# Patient Record
Sex: MALE | Race: WHITE | URBAN - METROPOLITAN AREA
[De-identification: names, ages, dates, MRNs, and addresses within clinical notes are randomized per-mention and may not be internally consistent; named-entity substitution may affect disease eponyms.]

---

## 2017-11-27 ENCOUNTER — TELEPHONE (OUTPATIENT)
Dept: FAMILY MEDICINE | Facility: CLINIC | Age: 47
End: 2017-11-27

## 2017-11-27 NOTE — TELEPHONE ENCOUNTER
Spoke with patient:    Is currently in [place] and won't be back until after 4 or 5pm tonight   For past 3-4 days having irregular heart beat - beats normally 3-5 times, then pauses, then resumes regular rhythm - occurred suddenly on Friday at 1AM and woke pt up  Having some fatigue currently   Currently NO pain or pressure in chest or feeling winded with talking- did have this right away but subsided since and did not return   Denies: nausea/vomiting, lightheadedness, edema, history of heart problems or irregular EKG  Has not checked BP recently. Pulse about 60 bpm    Normal amount of caffeine 2-3 cups coffee - seemed to worsen symptoms.   Avoiding alcohol and caffeine currently   More sedentary lifestyle recently     Arrhythmia has been persistent. Advised pt be seen soon today but ED if any return of chest pain, SOB, chest pressure, or new symptoms. Pt states he cannot be seen while in [place] but will plan to go to urgent care once back in [facility]. Agreed to go to ED if any new/worsening symptoms     Gilda CONNELL RN

## 2017-11-28 ENCOUNTER — TELEPHONE (OUTPATIENT)
Dept: FAMILY MEDICINE | Facility: CLINIC | Age: 47
End: 2017-11-28

## 2017-11-28 ENCOUNTER — OFFICE VISIT (OUTPATIENT)
Dept: FAMILY MEDICINE | Facility: CLINIC | Age: 47
End: 2017-11-28
Payer: COMMERCIAL

## 2017-11-28 VITALS
TEMPERATURE: 96.8 F | HEIGHT: 72 IN | HEART RATE: 71 BPM | DIASTOLIC BLOOD PRESSURE: 85 MMHG | SYSTOLIC BLOOD PRESSURE: 125 MMHG | BODY MASS INDEX: 22.75 KG/M2 | WEIGHT: 168 LBS | OXYGEN SATURATION: 99 %

## 2017-11-28 DIAGNOSIS — R00.2 PALPITATIONS: Primary | ICD-10-CM

## 2017-11-28 DIAGNOSIS — I49.3 PVC'S (PREMATURE VENTRICULAR CONTRACTIONS): ICD-10-CM

## 2017-11-28 DIAGNOSIS — I49.3 PVC (PREMATURE VENTRICULAR CONTRACTION): Primary | ICD-10-CM

## 2017-11-28 LAB
ANION GAP SERPL CALCULATED.3IONS-SCNC: 8 MMOL/L (ref 3–14)
BUN SERPL-MCNC: 15 MG/DL (ref 7–30)
CALCIUM SERPL-MCNC: 9.5 MG/DL (ref 8.5–10.1)
CHLORIDE SERPL-SCNC: 105 MMOL/L (ref 94–109)
CO2 SERPL-SCNC: 28 MMOL/L (ref 20–32)
CREAT SERPL-MCNC: 1.12 MG/DL (ref 0.66–1.25)
ERYTHROCYTE [DISTWIDTH] IN BLOOD BY AUTOMATED COUNT: 13.4 % (ref 10–15)
GFR SERPL CREATININE-BSD FRML MDRD: 70 ML/MIN/1.7M2
GLUCOSE SERPL-MCNC: 94 MG/DL (ref 70–99)
HCT VFR BLD AUTO: 45.5 % (ref 40–53)
HGB BLD-MCNC: 15.6 G/DL (ref 13.3–17.7)
MCH RBC QN AUTO: 30.4 PG (ref 26.5–33)
MCHC RBC AUTO-ENTMCNC: 34.3 G/DL (ref 31.5–36.5)
MCV RBC AUTO: 89 FL (ref 78–100)
PLATELET # BLD AUTO: 169 10E9/L (ref 150–450)
POTASSIUM SERPL-SCNC: 5 MMOL/L (ref 3.4–5.3)
RBC # BLD AUTO: 5.13 10E12/L (ref 4.4–5.9)
SODIUM SERPL-SCNC: 141 MMOL/L (ref 133–144)
TROPONIN I SERPL-MCNC: <0.015 UG/L (ref 0–0.04)
TSH SERPL DL<=0.005 MIU/L-ACNC: 1.72 MU/L (ref 0.4–4)
WBC # BLD AUTO: 3.9 10E9/L (ref 4–11)

## 2017-11-28 PROCEDURE — 85027 COMPLETE CBC AUTOMATED: CPT | Performed by: NURSE PRACTITIONER

## 2017-11-28 PROCEDURE — 36415 COLL VENOUS BLD VENIPUNCTURE: CPT | Performed by: NURSE PRACTITIONER

## 2017-11-28 PROCEDURE — 84484 ASSAY OF TROPONIN QUANT: CPT | Performed by: NURSE PRACTITIONER

## 2017-11-28 PROCEDURE — 93000 ELECTROCARDIOGRAM COMPLETE: CPT | Performed by: NURSE PRACTITIONER

## 2017-11-28 PROCEDURE — 80048 BASIC METABOLIC PNL TOTAL CA: CPT | Performed by: NURSE PRACTITIONER

## 2017-11-28 PROCEDURE — 99215 OFFICE O/P EST HI 40 MIN: CPT | Performed by: NURSE PRACTITIONER

## 2017-11-28 PROCEDURE — 84443 ASSAY THYROID STIM HORMONE: CPT | Performed by: NURSE PRACTITIONER

## 2017-11-28 NOTE — MR AVS SNAPSHOT
After Visit Summary   11/28/2017    Wilfrid Hernandez    MRN: [id number]           Patient Information     Date Of Birth          1970        Visit Information        Provider Department      11/28/2017 11:30 AM Shana Cobb APRN [facility]        Today's Diagnoses     Palpitations    -  1    PVC's (premature ventricular contractions)          Care Instructions      Understanding Premature Ventricular Contractions (PVCs)  Premature ventricular contractions (PVCs) are a type of abnormal heartbeat (arrhythmia). They are common and can occur in people of all ages from time to time. PVCs are almost never dangerous, but if you have other heart problems, PVCs can cause serious health issues.   How PVCs happen    Your heart has 4 chambers: 2 upper atria and 2 lower ventricles. Normally, a special group of cells begins the signal to start your heartbeat. These cells are in the sinoatrial (SA) node in the right atrium. The signal quickly travels down your heart s conducting system. It travels to the left and right ventricle. As it travels, the signal triggers nearby parts of your heart to contract. This allows your heart to squeeze in a coordinated way.  During a premature ventricular contraction, the signal to start your heartbeat instead comes from one of the ventricles. This signal is premature, meaning it happens before the SA node has had a chance to fire. The signal spreads through the rest of your heart, causing a heartbeat. If this happens very soon after the previous heartbeat, your heart will push out very little blood. This causes a feeling of a pause between beats. If it happens a little later, your heart pushes out an almost normal amount of blood. This leads to a feeling of an extra heartbeat. So, the heart has a  premature  heartbeat in between normal heartbeats.  What causes PVCs?  PVCs can be considered a normal phenomenon, especially if they happen rarely, don't cause  serious symptoms, and are not associated with other serious medical conditions. PVCs can be seen more often if you have certain conditions such as:     Advancing age    Reduced blood flow to your heart such as in coronary artery disease    Scarring after a heart attack (myocardial infarction)    Cardiomyopathy (heart muscle disease) of any cause    Heart failure    Heart valve disease    High blood pressure    Congenital heart disease    Electrolyte problems, such as low potassium levels    Increased adrenaline, such as with anxiety    Certain medicines, such as in digoxin toxicity  Rarely, frequent PVCs may be seen when there are no risk factors or other medical conditions. This is called idiopathic PVC.   What are the symptoms of PVCs?  Most people with infrequent PVCs don t have symptoms. However, the more PVCs you have, the more likely you are to feel them. When symptoms do happen, they are usually minor. Symptoms may include:    An awareness of the heart beating    A fluttering or flip-flop feeling in your chest    Feeling of a  skipped  or  extra  heartbeat    Dizziness and near-fainting    A pulsing sensation in the neck  PVCs may cause more severe symptoms if you have another heart problem, such as heart failure.  How are PVCs diagnosed?  Your healthcare provider will ask about your health history and give you a physical exam. Recording the PVCs and their frequency on a heart rhythm monitor is the best way to diagnose PVCs. This can best be done with:    Electrocardiogram (ECG). This test records the electrical activity of your heart. During an ECG, small pads (electrodes) are placed on your chest, arms, and legs. Wires connect the pads to a machine, which records your heart's electrical signals.    Heart monitoring. There are 2 types of external heart monitors:    Holter monitor. This monitor can be worn for 1 to 7 days. It provides a constant recording of heart activity. After the test is done, your  healthcare provider analyzes the recording.    Event monitor. These monitors can be used for 3 to 4 weeks. One kind is a memory loop recorder. This monitor records constantly, but stores the recording only when you press a button. The other kind is a credit card-sized recorder. This monitor is turned on only during an episode. With both types, you send the recordings of symptoms to your healthcare provider over the phone.  Other tests, which may be done to better evaluate your heart include:    Echocardiography. This test uses ultrasound to look at your heart s structure and function.    Cardiac stress testing. This test uses electrocardiogram during exercise to see how your heart responds and to evaluate heart artery blood flow.    Cardiac CT or MRI. These are imaging tests that shows details of the heart and blood vessels.    Blood tests. Blood tests may be done to check potassium and thyroid levels.  How are PVCs treated?  Treatment depends on the cause and if you have symptoms. If you have no symptoms and no underlying heart problems, you may need no treatment.   If it is needed, treatment can involve:     Lifestyle changes. Your doctor may suggest you exercise and limit caffeine and alcohol. If you smoke, you'll be advised to quit. Your doctor can help you find medicines or nicotine replacement products to help. A support group for those quitting smoking is also helpful.    Radiofrequency catheter ablation. This treatment uses radiofrequency energy to destroy the area of heart tissue that is causing the PVC.     Medicines. Two types of medicine can help with PVCs. Beta blockers and calcium channel blockers both can lower the heart rate and reduce blood pressure.   If you have structural heart problems, you may be referred to a doctor who specializes in the electrical system of the heart. You may need treatment for an underlying heart problem. In severe cases, an ICD (implantable cardioverter defibrillator) may  "be implanted. This device can help normalize the heart rhythm.  Date Last Reviewed: 2017-2017 [employer]. [address] [place] [address], [place] [address]. All rights reserved. This information is not intended as a substitute for professional medical care. Always follow your healthcare professional's instructions.                Follow-ups after your visit        Who to contact     If you have questions or need follow up information about today's clinic visit or your schedule please contact [facility] directly at [phone].  Normal or non-critical lab and imaging results will be communicated to you by cinvolvehart, letter or phone within 4 business days after the clinic has received the results. If you do not hear from us within 7 days, please contact the clinic through Netact or phone. If you have a critical or abnormal lab result, we will notify you by phone as soon as possible.  Submit refill requests through [employer]ify or call your pharmacy and they will forward the refill request to us. Please allow 3 business days for your refill to be completed.          Additional Information About Your Visit        Storify Information     [employer] lets you send messages to your doctor, view your test results, renew your prescriptions, schedule appointments and more. To sign up, go to [email or web address] . Click on \"Log in\" on the left side of the screen, which will take you to the Welcome page. Then click on \"Sign up Now\" on the right side of the page.     You will be asked to enter the access code listed below, as well as some personal information. Please follow the directions to create your username and password.     Your access code is: [email or web address]  Expires: 2018 12:19 PM     Your access code will  in 90 days. If you need help or a new code, please call your [facility] or [phone].        Care EveryWhere ID     This is your Care EveryWhere ID. This could be used by " other organizations to access your [place] medical records  [id number]        Your Vitals Were     Pulse Temperature Height Pulse Oximetry BMI (Body Mass Index)       71 96.8  F (36  C) (Oral) 6' (1.829 m) 99% 22.78 kg/m2        Blood Pressure from Last 3 Encounters:   11/28/17 125/85   11/09/16 131/83   10/13/16 124/81    Weight from Last 3 Encounters:   11/28/17 168 lb (76.2 kg)   11/09/16 170 lb 12.8 oz (77.5 kg)   10/13/16 173 lb (78.5 kg)              We Performed the Following     Basic metabolic panel  (Ca, Cl, CO2, Creat, Gluc, K, Na, BUN)     CBC with platelets     EKG 12-lead complete w/read - Clinics     Troponin I     TSH with free T4 reflex        Primary Care Provider Office Phone # Fax #    Maverick Penn [phone] [address] [facility] [address]  [facility] [address]        Equal Access to Services     DMITRI MATA : Hadii aad ku hadasho Soomaali, waaxda luqadaha, qaybta kaalmada adeegyakee, bird muse . So [facility] [place] [phone].    ATENCIÓN: Si habla español, tiene a damon disposición servicios gratuitos de asistencia lingüística. [facility] [phone].    We comply with applicable federal civil rights laws and [place] laws. We do not discriminate on the basis of race, color, national origin, age, disability, sex, sexual orientation, or gender identity.            Thank you!     Thank you for choosing [facility]  for your care. Our goal is always to provide you with excellent care. Hearing back from our patients is one way we can continue to improve our services. Please take a few minutes to complete the written survey that you may receive in the mail after your visit with us. Thank you!             Your Updated Medication List - Protect others around you: Learn how to safely use, store and throw away your medicines at www.disposemymeds.org.      Notice  As of 11/28/2017 12:19 PM    You have not been prescribed any medications.

## 2017-11-28 NOTE — TELEPHONE ENCOUNTER
Called pt back. Timing is ok. He can also f/u with cardiology at least 1 week after eval is complete

## 2017-11-28 NOTE — PATIENT INSTRUCTIONS
Understanding Premature Ventricular Contractions (PVCs)  Premature ventricular contractions (PVCs) are a type of abnormal heartbeat (arrhythmia). They are common and can occur in people of all ages from time to time. PVCs are almost never dangerous, but if you have other heart problems, PVCs can cause serious health issues.   How PVCs happen    Your heart has 4 chambers: 2 upper atria and 2 lower ventricles. Normally, a special group of cells begins the signal to start your heartbeat. These cells are in the sinoatrial (SA) node in the right atrium. The signal quickly travels down your heart s conducting system. It travels to the left and right ventricle. As it travels, the signal triggers nearby parts of your heart to contract. This allows your heart to squeeze in a coordinated way.  During a premature ventricular contraction, the signal to start your heartbeat instead comes from one of the ventricles. This signal is premature, meaning it happens before the SA node has had a chance to fire. The signal spreads through the rest of your heart, causing a heartbeat. If this happens very soon after the previous heartbeat, your heart will push out very little blood. This causes a feeling of a pause between beats. If it happens a little later, your heart pushes out an almost normal amount of blood. This leads to a feeling of an extra heartbeat. So, the heart has a  premature  heartbeat in between normal heartbeats.  What causes PVCs?  PVCs can be considered a normal phenomenon, especially if they happen rarely, don't cause serious symptoms, and are not associated with other serious medical conditions. PVCs can be seen more often if you have certain conditions such as:     Advancing age    Reduced blood flow to your heart such as in coronary artery disease    Scarring after a heart attack (myocardial infarction)    Cardiomyopathy (heart muscle disease) of any cause    Heart failure    Heart valve disease    High blood  pressure    Congenital heart disease    Electrolyte problems, such as low potassium levels    Increased adrenaline, such as with anxiety    Certain medicines, such as in digoxin toxicity  Rarely, frequent PVCs may be seen when there are no risk factors or other medical conditions. This is called idiopathic PVC.   What are the symptoms of PVCs?  Most people with infrequent PVCs don t have symptoms. However, the more PVCs you have, the more likely you are to feel them. When symptoms do happen, they are usually minor. Symptoms may include:    An awareness of the heart beating    A fluttering or flip-flop feeling in your chest    Feeling of a  skipped  or  extra  heartbeat    Dizziness and near-fainting    A pulsing sensation in the neck  PVCs may cause more severe symptoms if you have another heart problem, such as heart failure.  How are PVCs diagnosed?  Your healthcare provider will ask about your health history and give you a physical exam. Recording the PVCs and their frequency on a heart rhythm monitor is the best way to diagnose PVCs. This can best be done with:    Electrocardiogram (ECG). This test records the electrical activity of your heart. During an ECG, small pads (electrodes) are placed on your chest, arms, and legs. Wires connect the pads to a machine, which records your heart's electrical signals.    Heart monitoring. There are 2 types of external heart monitors:    Holter monitor. This monitor can be worn for 1 to 7 days. It provides a constant recording of heart activity. After the test is done, your healthcare provider analyzes the recording.    Event monitor. These monitors can be used for 3 to 4 weeks. One kind is a memory loop recorder. This monitor records constantly, but stores the recording only when you press a button. The other kind is a credit card-sized recorder. This monitor is turned on only during an episode. With both types, you send the recordings of symptoms to your healthcare  provider over the phone.  Other tests, which may be done to better evaluate your heart include:    Echocardiography. This test uses ultrasound to look at your heart s structure and function.    Cardiac stress testing. This test uses electrocardiogram during exercise to see how your heart responds and to evaluate heart artery blood flow.    Cardiac CT or MRI. These are imaging tests that shows details of the heart and blood vessels.    Blood tests. Blood tests may be done to check potassium and thyroid levels.  How are PVCs treated?  Treatment depends on the cause and if you have symptoms. If you have no symptoms and no underlying heart problems, you may need no treatment.   If it is needed, treatment can involve:     Lifestyle changes. Your doctor may suggest you exercise and limit caffeine and alcohol. If you smoke, you'll be advised to quit. Your doctor can help you find medicines or nicotine replacement products to help. A support group for those quitting smoking is also helpful.    Radiofrequency catheter ablation. This treatment uses radiofrequency energy to destroy the area of heart tissue that is causing the PVC.     Medicines. Two types of medicine can help with PVCs. Beta blockers and calcium channel blockers both can lower the heart rate and reduce blood pressure.   If you have structural heart problems, you may be referred to a doctor who specializes in the electrical system of the heart. You may need treatment for an underlying heart problem. In severe cases, an ICD (implantable cardioverter defibrillator) may be implanted. This device can help normalize the heart rhythm.  Date Last Reviewed: 8/1/2017 2000-2017 [employer]. [address] [place] [address], [place] [address]. All rights reserved. This information is not intended as a substitute for professional medical care. Always follow your healthcare professional's instructions.

## 2017-11-28 NOTE — PROGRESS NOTES
HPI      SUBJECTIVE:   Wilfrid Hernandez is a 47 year old male who presents to clinic today for the following health issues:    Chief Complaint   Patient presents with     Heart Problem     irregular heart beats --woke pt up Friday 1 AM per Tel Enc        Thanksgiving night started with irregular heart beat, felt like it was missing. Did have alcohol and more food than normal   The next morning had a very mild chest pain, fatigue and lightheadedness   The CP was on and off for the first 2 days and often just a pressure   No N/V   Was then a couple days after at a higher elevation at 6,000 feet and was slightly lightheaded and SOB   SOB is improved but continues just slightly   Works as a  so was concerned about this new heart symptom       No past medical history on file.  No past surgical history on file.  Social History   Substance Use Topics     Smoking status: Former Smoker     Packs/day: 0.50     Years: 5.00     Types: Cigarettes     Quit date: 1/1/1997     Smokeless tobacco: Never Used     Alcohol use 0.0 oz/week     0 Standard drinks or equivalent per week      Comment: 3 glasses of wine per week     No current outpatient prescriptions on file.     Allergies   Allergen Reactions     No Known Allergies        Reviewed and updated as needed this visit by clinical staff and provider      ROS  Detailed as above       /85 (Cuff Size: Adult Regular)  Pulse 71  Temp 96.8  F (36  C) (Oral)  Ht 6' (1.829 m)  Wt 168 lb (76.2 kg)  SpO2 99%  BMI 22.78 kg/m2      Physical Exam   Constitutional: He is well-developed, well-nourished, and in no distress.   HENT:   Head: Normocephalic.   Eyes: Conjunctivae are normal.   Neck: Normal range of motion.   Cardiovascular: Normal rate, regular rhythm and normal heart sounds.    No murmur heard.  Frequent ectopic beats    Pulmonary/Chest: Effort normal and breath sounds normal. No respiratory distress.   Musculoskeletal: Normal range of motion.   Neurological: He is  alert.   Skin: Skin is warm and dry.   Psychiatric: Mood and affect normal.   Vitals reviewed.      Assessment and Plan:       ICD-10-CM    1. Palpitations R00.2 EKG 12-lead complete w/read - Clinics     CBC with platelets     Basic metabolic panel  (Ca, Cl, CO2, Creat, Gluc, K, Na, BUN)     TSH with free T4 reflex     Troponin I     Echocardiogram Complete   2. PVC's (premature ventricular contractions) I49.3 Echocardiogram Complete     Holter Monitor 24 hour - Adult       New frequent PVCs that started at thanksgiving with some alcohol and more food than normal. No other acute findings on ekg today  Completed basic labs per above and they are normal   Considering this acute rather significant onset, will complete further workup of echo and holter.   After these results then we will refer to cardiology for further treatment discussion     I called pt with all results and gave info for echo and holter.       Shana Cobb, APRN, CNP  [facility]

## 2017-11-28 NOTE — TELEPHONE ENCOUNTER
Reason for Call:  Questions regarding upcoming appointment     Detailed comments: Pt has questions regarding a cardiology appointment he has   Set up. He states the appointment is in a week and he wants to make sure that will be okay.   **PT requesting to speak with BRANDAN Cobb**   Phone Number Patient can be reached at: Cell number on file:    Telephone Information:   Mobile [phone]       Best Time: anytime    Can we leave a detailed message on this number? YES    Call taken on 11/28/2017 at 4:36 PM by Mitra Espinal

## 2017-11-28 NOTE — NURSING NOTE
Chief Complaint   Patient presents with     Heart Problem     irregular heart beats --woke pt up Friday 1 AM per Tel Enc        Initial /85 (Cuff Size: Adult Regular)  Pulse 71  Temp 96.8  F (36  C) (Oral)  Ht 6' (1.829 m)  Wt 168 lb (76.2 kg)  SpO2 99%  BMI 22.78 kg/m2 Estimated body mass index is 22.78 kg/(m^2) as calculated from the following:    Height as of this encounter: 6' (1.829 m).    Weight as of this encounter: 168 lb (76.2 kg).  Medication Reconciliation: complete

## 2017-12-05 ENCOUNTER — HOSPITAL ENCOUNTER (OUTPATIENT)
Dept: CARDIOLOGY | Facility: CLINIC | Age: 47
End: 2017-12-05
Attending: NURSE PRACTITIONER
Payer: COMMERCIAL

## 2017-12-05 ENCOUNTER — HOSPITAL ENCOUNTER (OUTPATIENT)
Dept: CARDIOLOGY | Facility: CLINIC | Age: 47
Discharge: HOME | End: 2017-12-05
Attending: NURSE PRACTITIONER | Admitting: NURSE PRACTITIONER
Payer: COMMERCIAL

## 2017-12-05 DIAGNOSIS — I49.3 PVC'S (PREMATURE VENTRICULAR CONTRACTIONS): ICD-10-CM

## 2017-12-05 DIAGNOSIS — R00.2 PALPITATIONS: ICD-10-CM

## 2017-12-05 PROCEDURE — 93227 XTRNL ECG REC<48 HR R&I: CPT | Performed by: INTERNAL MEDICINE

## 2017-12-05 PROCEDURE — 93306 TTE W/DOPPLER COMPLETE: CPT

## 2017-12-05 PROCEDURE — 93306 TTE W/DOPPLER COMPLETE: CPT | Mod: 26 | Performed by: INTERNAL MEDICINE

## 2017-12-05 PROCEDURE — 93225 XTRNL ECG REC<48 HRS REC: CPT

## 2017-12-05 NOTE — LETTER
[address] [facility]  [address]  [place]  [address]  Tel: [phone]    December 12, 2017    Wilfrid Hernandez  [address] [place] [address]        Dear John David,    Here is the holter monitor report as well as the echocardiogram report for your records as we discussed. Let us know if you have any questions.        Sincerely,    Shana Cobb NP/SML      Enclosure: reports

## 2017-12-11 ENCOUNTER — TELEPHONE (OUTPATIENT)
Dept: FAMILY MEDICINE | Facility: CLINIC | Age: 47
End: 2017-12-11

## 2017-12-11 NOTE — TELEPHONE ENCOUNTER
Zuhair Saldana,    The patient was calling back from the voice message you left him on Friday on his Holter and Echo. I did not see any detailed message that I could give him but he was ok with that, he said he would rather talk to you directly. He knows you wont be back in the office until tomorrow and will expect to talk to you sometime tomorrow regarding these results.    Jeyson Liriano, CMA

## 2017-12-12 NOTE — PROGRESS NOTES
Will you please mail page 1 of the scanned report to the pt. There is also an echo to include in the same envelope.     Wilfrid  Here is the holter monitor report as well as the echocardiogram report for your records as we discussed. Let us know if you have any questions  RUT Rosas CNP

## 2017-12-12 NOTE — PROGRESS NOTES
Will you please mail to the pt. There is also another report (holter) to include in the same envelope.     Wilfrid  Here is the holter monitor report as well as the echocardiogram report for your records as we discussed. Let us know if you have any questions  RUT Rosas CNP

## 2017-12-19 ENCOUNTER — OFFICE VISIT (OUTPATIENT)
Dept: CARDIOLOGY | Facility: CLINIC | Age: 47
End: 2017-12-19
Attending: NURSE PRACTITIONER
Payer: COMMERCIAL

## 2017-12-19 VITALS
OXYGEN SATURATION: 99 % | BODY MASS INDEX: 22.75 KG/M2 | WEIGHT: 168 LBS | HEART RATE: 97 BPM | DIASTOLIC BLOOD PRESSURE: 56 MMHG | HEIGHT: 72 IN | SYSTOLIC BLOOD PRESSURE: 148 MMHG

## 2017-12-19 DIAGNOSIS — I49.9 IRREGULAR HEART BEAT: Primary | ICD-10-CM

## 2017-12-19 DIAGNOSIS — I49.3 PVC'S (PREMATURE VENTRICULAR CONTRACTIONS): ICD-10-CM

## 2017-12-19 PROCEDURE — 99213 OFFICE O/P EST LOW 20 MIN: CPT | Performed by: INTERNAL MEDICINE

## 2017-12-19 PROCEDURE — 93000 ELECTROCARDIOGRAM COMPLETE: CPT | Performed by: INTERNAL MEDICINE

## 2017-12-19 NOTE — LETTER
12/19/2017      Maverick Penn MD  [address] [facility] [address]  [facility] [address]      RE: Wilfrid Hernandez       Dear Colleague,    I had the pleasure of seeing Wilfrid Hernandez in the [facility].    HISTORY OF PRESENT ILLNESS:    Thank you for referring Mr. Wilfrid Hernandez for evaluation of symptomatic premature ventricular beats.  He is a delightful 47-year-old gentleman who is a .  He has no history of cardiac disease and takes no medications routinely.      He tells me that the night after Thanksgiving, he experienced palpitation.  This was pronounced.  He initially experienced a vague chest discomfort which later subsided, though the sensation of palpitation remained.      On 11/28/2017, he was seen in Dr. Penn's office and a 12-lead ECG showed sinus rhythm with 2 PVCs.  Subsequently, a 24-hour Holter monitor showed a PVC burden of 7%.  The patient had mostly single PVCs; there were some couplets but no runs of ventricular tachycardia.      Mr. Hernandez at that point stopped consuming caffeine and started exercising regularly.  Over the past week or so, symptoms have completely subsided.  He feels very well.      He does not have chest discomfort with exertion.  He walks fast on his treadmill and a 7.5% incline without symptoms for 30 minutes.  He has no discomfort with that.  He has never had syncope.  There is no family history of premature heart disease or sudden cardiac death.      I do have a fasting lipid panel from last year with LDL of 129 with an HDL of 66.      PHYSICAL EXAMINATION:   VITAL SIGNS:  Blood pressure 140/62, pulse 90 and regular, weight 76 kg, height 182 cm.   GENERAL:  He is a pleasant gentleman who appears healthy.   HEENT:  Normocephalic.  Sclerae anicteric.  Mouth, oropharynx clear.   NECK:  Supple, without carotid bruits.  Normal jugular venous pressure.  No lymphadenopathy.   LUNGS:  Clear bilaterally.   CARDIOVASCULAR:  Normal JVP.   Regular rhythm, without gallop, murmur, or rub.   ABDOMEN:  Soft, nontender.  Negative HJR.   EXTREMITIES:  No edema, 2+ peripheral pulses throughout.   SKIN:  No rashes.   BACK:  No CVA tenderness.   NEUROLOGIC:  Alert and oriented x3.      DIAGNOSTIC STUDIES:    Recent laboratory tests:  Sodium 141, potassium 5.0, creatinine 1.1.  Cholesterol 221, , HDL 66, triglycerides 130.  TSH 1.7.  Troponin less than 0.015.      His 12-lead ECG today was normal.  The previous ECG on 11/28/2017 showed sinus rhythm with 2 PVCs.  The PVCs exhibited superior axis.  They were not seen in the precordial leads and therefore it is not clear whether they arise in the right or left ventricle.      An echocardiogram in early December was a normal study.  There was mild 1+ tricuspid regurgitation.  LV function was normal with EF of 55-60%.      For results of his Holter monitor, see HPI.        IMPRESSION:   1.  Idiopathic premature ventricular beats.  Mr. Hernandez went through a 2-3 week period with symptomatic PVCs.  The etiology is unknown.  The patient has no evidence of structural heart disease by history, physical examination, electrocardiography and echocardiography.  For the sake of completeness, I have requested an exercise stress test on a James protocol.       Provided that the stress test is normal, I would recommend no further cardiac evaluation or treatment, especially considering that his symptoms have resolved.  I did explain that PVCs can sometimes recur.  If they do I encouraged Wilfrid to ignore them as much as possible as they are innocent with no apparent adverse long-term implications.      RECOMMENDATIONS:   A.  Exercise stress test on a James protocol.   B.  If the stress test is normal, there is no need for longterm followup with Cardiology for this innocent issue.      Thank you for the opportunity to be part of his care.        No outpatient encounter prescriptions on file as of 12/19/2017.     No  facility-administered encounter medications on file as of 12/19/2017.        Again, thank you for allowing me to participate in the care of your patient.      Sincerely,    Stone Borrero MD     [facility]    cc:   R[facility]  [address] [facility] [address]  [place] [address]

## 2017-12-19 NOTE — PROGRESS NOTES
HPI and Plan:   See dictation    Orders Placed This Encounter   Procedures     EKG 12-lead complete w/read - Clinics (performed today)     Treadmill Stress test       No orders of the defined types were placed in this encounter.      There are no discontinued medications.      Encounter Diagnoses   Name Primary?     CARDIOVASCULAR SCREENING; LDL GOAL LESS THAN 160 Yes     Irregular heart beat      PVC's (premature ventricular contractions)        CURRENT MEDICATIONS:  No current outpatient prescriptions on file.       ALLERGIES     Allergies   Allergen Reactions     No Known Allergies        PAST MEDICAL HISTORY:  Past Medical History:   Diagnosis Date     Palpitations      PVC (premature ventricular contraction)        PAST SURGICAL HISTORY:  History reviewed. No pertinent surgical history.    FAMILY HISTORY:  Family History   Problem Relation Age of Onset     HEART DISEASE No family hx of      DIABETES No family hx of        SOCIAL HISTORY:  Social History     Social History     Marital status:      Spouse name: Janice     Number of children: 0     Years of education: N/A     Occupational History      [employer]     Social History Main Topics     Smoking status: Former Smoker     Packs/day: 0.50     Years: 5.00     Types: Cigarettes     Quit date: 1/1/1997     Smokeless tobacco: Never Used     Alcohol use 0.0 oz/week     0 Standard drinks or equivalent per week      Comment: 3 glasses of wine per week     Drug use: No     Sexual activity: Yes     Partners: Female     Other Topics Concern     None     Social History Narrative       Review of Systems:  Skin:  Negative       Eyes:  Negative      ENT:  Negative      Respiratory:  Negative       Cardiovascular:  Negative      Gastroenterology: Negative      Genitourinary:  Negative      Musculoskeletal:  Negative      Neurologic:  Negative      Psychiatric:  Negative      Heme/Lymph/Imm:  Negative      Endocrine:  Negative          [id number]

## 2017-12-19 NOTE — PROGRESS NOTES
HISTORY OF PRESENT ILLNESS:    Thank you for referring Mr. Wilfrid Hernandez for evaluation of symptomatic premature ventricular beats.  He is a delightful 47-year-old gentleman who is a .  He has no history of cardiac disease and takes no medications routinely.      He tells me that the night after Thanksgiving, he experienced palpitation.  This was pronounced.  He initially experienced a vague chest discomfort which later subsided, though the sensation of palpitation remained.      On 11/28/2017, he was seen in Dr. Penn's office and a 12-lead ECG showed sinus rhythm with 2 PVCs.  Subsequently, a 24-hour Holter monitor showed a PVC burden of 7%.  The patient had mostly single PVCs; there were some couplets but no runs of ventricular tachycardia.      Mr. Hernandez at that point stopped consuming caffeine and started exercising regularly.  Over the past week or so, symptoms have completely subsided.  He feels very well.      He does not have chest discomfort with exertion.  He walks fast on his treadmill and a 7.5% incline without symptoms for 30 minutes.  He has no discomfort with that.  He has never had syncope.  There is no family history of premature heart disease or sudden cardiac death.      I do have a fasting lipid panel from last year with LDL of 129 with an HDL of 66.      PHYSICAL EXAMINATION:   VITAL SIGNS:  Blood pressure 140/62, pulse 90 and regular, weight 76 kg, height 182 cm.   GENERAL:  He is a pleasant gentleman who appears healthy.   HEENT:  Normocephalic.  Sclerae anicteric.  Mouth, oropharynx clear.   NECK:  Supple, without carotid bruits.  Normal jugular venous pressure.  No lymphadenopathy.   LUNGS:  Clear bilaterally.   CARDIOVASCULAR:  Normal JVP.  Regular rhythm, without gallop, murmur, or rub.   ABDOMEN:  Soft, nontender.  Negative HJR.   EXTREMITIES:  No edema, 2+ peripheral pulses throughout.   SKIN:  No rashes.   BACK:  No CVA tenderness.   NEUROLOGIC:  Alert and oriented  x3.      DIAGNOSTIC STUDIES:    Recent laboratory tests:  Sodium 141, potassium 5.0, creatinine 1.1.  Cholesterol 221, , HDL 66, triglycerides 130.  TSH 1.7.  Troponin less than 0.015.      His 12-lead ECG today was normal.  The previous ECG on 11/28/2017 showed sinus rhythm with 2 PVCs.  The PVCs exhibited superior axis.  They were not seen in the precordial leads and therefore it is not clear whether they arise in the right or left ventricle.      An echocardiogram in early December was a normal study.  There was mild 1+ tricuspid regurgitation.  LV function was normal with EF of 55-60%.      For results of his Holter monitor, see HPI.        IMPRESSION:   1.  Idiopathic premature ventricular beats.  Mr. Hernandez went through a 2-3 week period with symptomatic PVCs.  The etiology is unknown.  The patient has no evidence of structural heart disease by history, physical examination, electrocardiography and echocardiography.  For the sake of completeness, I have requested an exercise stress test on a James protocol.       Provided that the stress test is normal, I would recommend no further cardiac evaluation or treatment, especially considering that his symptoms have resolved.  I did explain that PVCs can sometimes recur.  If they do I encouraged Wilfrid to ignore them as much as possible as they are innocent with no apparent adverse long-term implications.      RECOMMENDATIONS:   A.  Exercise stress test on a James protocol.   B.  If the stress test is normal, there is no need for longterm followup with Cardiology for this innocent issue.      Thank you for the opportunity to be part of his care.         CARLOTTA WATERS MD, FACC         cc:   Shana Cobb NP   [address]  [address]      Maverick Penn MD   [address]  [address]          D: 12/19/2017 13:22   T: 12/19/2017 14:57    MT: RICHARDSON      Name:     TARA HUNTER   MRN:      [id number]        Account:      [id number]   :      1970           Service Date: 2017      Document: [id number]

## 2017-12-19 NOTE — MR AVS SNAPSHOT
After Visit Summary   12/19/2017    Wilfrid Hernandez    MRN: [id number]           Patient Information     Date Of Birth          1970        Visit Information        Provider Department      12/19/2017 12:45 PM Stone Borrero MD [facility]   [place]        Today's Diagnoses     CARDIOVASCULAR SCREENING; LDL GOAL LESS THAN 160    -  1    Irregular heart beat        PVC's (premature ventricular contractions)           Follow-ups after your visit        Your next 10 appointments already scheduled     Dec 20, 2017  2:00 PM CST   Cardiac Stress Test with RH TREADMILL   [facility] ([facility])    [address]  [facility] [address]   [phone]           1. Please bring or wear a comfortable two-piece outfit and walking shoes. 2. Stop eating 3 hours before the test. You may drink water or juice. 3. Stop all caffeine 12 hours before the test. This includes coffee, tea, soda pop, chocolate and certain medicines (such as Anacin and Excederin). Also avoid decaf coffee and tea, as these contain small amounts of caffeine. 4. No alcohol, smoking or use of other tobacco products for 12 hours before the test. 5. Refer to your provider instructions to see if you need to stop any medications (such as beta-blockers or nitrates) for this test. 6. For patients with diabetes: - If you take insulin, call your diabetes care team. Ask if you should take a   dose the morning of your test. - If you take diabetes medicine by mouth, don't take it on the morning of your test. Bring it with you to take after the test. (If you have questions, call your diabetes care team) 7. When you arrive, please tell us if: -You have diabetes. -You have taken Viagra, Cialis or Levitra in the past 48 hours. 8. For any questions that cannot be answered, please contact the ordering physician              Future tests that were ordered for you today     Open  "Future Orders        Priority Expected Expires Ordered    Treadmill Stress test Routine 2017            Who to contact     If you have questions or need follow up information about today's clinic visit or your schedule please contact [facility] directly at [phone].  Normal or non-critical lab and imaging results will be communicated to you by MyChart, letter or phone within 4 business days after the clinic has received the results. If you do not hear from us within 7 days, please contact the clinic through reQallhart or phone. If you have a critical or abnormal lab result, we will notify you by phone as soon as possible.  Submit refill requests through zuuka! or call your pharmacy and they will forward the refill request to us. Please allow 3 business days for your refill to be completed.          Additional Information About Your Visit        MyChart Information     zuuka! lets you send messages to your doctor, view your test results, renew your prescriptions, schedule appointments and more. To sign up, go to [email or web address] . Click on \"Log in\" on the left side of the screen, which will take you to the Welcome page. Then click on \"Sign up Now\" on the right side of the page.     You will be asked to enter the access code listed below, as well as some personal information. Please follow the directions to create your username and password.     Your access code is: [email or web address]  Expires: 2018 12:19 PM     Your access code will  in 90 days. If you need help or a new code, please call your [place] clinic or [phone].        Care EveryWhere ID     This is your Care EveryWhere ID. This could be used by other organizations to access your [place] medical records  [id number]        Your Vitals Were     Pulse Height Pulse Oximetry BMI (Body Mass Index)          97 1.829 m (6' 0.01\") 99% 22.78 kg/m2         Blood Pressure from Last 3 " Encounters:   12/19/17 148/56   11/28/17 125/85   11/09/16 131/83    Weight from Last 3 Encounters:   12/19/17 76.2 kg (168 lb)   11/28/17 76.2 kg (168 lb)   11/09/16 77.5 kg (170 lb 12.8 oz)              We Performed the Following     EKG 12-lead complete w/read - Clinics (performed today)        Primary Care Provider Office Phone # Fax #    Mavreick Penn [phone] [address]  [facility] [address]        Equal Access to Services     [facility]: Hadii aad ku hadasho [place], waaxda luqadaha, qaybta kaalmada adeagustinyada, bird muse . [facility] [phone].    ATENCIÓN: Si habla español, tiene a damon disposición servicios gratuitos de asistencia lingüística. Llame al [phone].    We comply with applicable federal civil rights laws and [place] laws. We do not discriminate on the basis of race, color, national origin, age, disability, sex, sexual orientation, or gender identity.            Thank you!     Thank you for choosing [facility]  for your care. Our goal is always to provide you with excellent care. Hearing back from our patients is one way we can continue to improve our services. Please take a few minutes to complete the written survey that you may receive in the mail after your visit with us. Thank you!             Your Updated Medication List - Protect others around you: Learn how to safely use, store and throw away your medicines at www.disposemymeds.org.      Notice  As of 12/19/2017  1:17 PM    You have not been prescribed any medications.

## 2017-12-20 ENCOUNTER — HOSPITAL ENCOUNTER (OUTPATIENT)
Dept: CARDIOLOGY | Facility: CLINIC | Age: 47
Discharge: HOME | End: 2017-12-20
Attending: INTERNAL MEDICINE | Admitting: INTERNAL MEDICINE
Payer: COMMERCIAL

## 2017-12-20 DIAGNOSIS — I49.3 PVC'S (PREMATURE VENTRICULAR CONTRACTIONS): ICD-10-CM

## 2017-12-20 PROCEDURE — 93016 CV STRESS TEST SUPVJ ONLY: CPT | Performed by: INTERNAL MEDICINE

## 2017-12-20 PROCEDURE — 93017 CV STRESS TEST TRACING ONLY: CPT

## 2017-12-20 PROCEDURE — 93018 CV STRESS TEST I&R ONLY: CPT | Performed by: INTERNAL MEDICINE

## 2017-12-21 ENCOUNTER — DOCUMENTATION ONLY (OUTPATIENT)
Dept: CARDIOLOGY | Facility: CLINIC | Age: 47
End: 2017-12-21

## 2017-12-21 ENCOUNTER — TELEPHONE (OUTPATIENT)
Dept: CARDIOLOGY | Facility: CLINIC | Age: 47
End: 2017-12-21

## 2017-12-21 NOTE — PROGRESS NOTES
Patient calling back to request copy of Stress test as well as OV notes from Dr Borrero to bring to his FAA MD appointment. Printed both and placed at  in drawer- pt to  tomorrow. ERMIAS Dale

## 2018-09-20 ENCOUNTER — TELEPHONE (OUTPATIENT)
Dept: CARDIOLOGY | Facility: CLINIC | Age: 48
End: 2018-09-20

## 2018-09-20 DIAGNOSIS — I49.3 PVC'S (PREMATURE VENTRICULAR CONTRACTIONS): Primary | ICD-10-CM

## 2018-09-20 NOTE — TELEPHONE ENCOUNTER
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 Borrero and 24 hr holter.I reviewed with Dr. Borrero this morning and he agrees to see patient after 24 hr holter has been completed.I have scheduled patient for 10/22 with Dr. Borrero and left patient a vm this morning that order for holter has been placed and that he can call scheduling to have this done no later than 10/15. Patient stated that he does not have health insurance at the present time and asked about fees for holter and clinic visit. I told him  That clinic visit would be in the area of 200-300.00 and holter around 100.00.Chad

## 2018-10-15 NOTE — TELEPHONE ENCOUNTER
Patient called in to say that he will have to cancel his holter appointment today because he was just notified this past Friday that his ou of pocket cost would be [phone] which he cannot afford. 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 He is going to explore other health care institutions for their holter costs. I told him that we can fax orders if need be. I provided him with both the CPT and ICD-10 codes to help him check out costs. We mutually agreed to reschedule his appointment with Dr. Borrero from 10/22 to November 7 at 12:45. He will keep us informed. Chad

## 2018-10-22 NOTE — TELEPHONE ENCOUNTER
Patient called back to inform us that he will have holter placed at [facility] and to have holter order faxed to [phone] att: Sarah Beth Peres-Her phone number is . I did so after talking with patient and requested holter results be faxed back to [phone] and stated patient has clinic visit with Dr. Borrero on 11/16.Chad

## 2018-10-22 NOTE — TELEPHONE ENCOUNTER
Patient called to inform us that he is narrowing down the institutions that he will get his holter done at that will be far less expensive that here at [facility]. He will call us when he has decided with the phone and fax numbers. Chad

## 2018-10-31 ENCOUNTER — TELEPHONE (OUTPATIENT)
Dept: CARDIOLOGY | Facility: CLINIC | Age: 48
End: 2018-10-31

## 2018-10-31 NOTE — TELEPHONE ENCOUNTER
Call to patient to check on status of 24 hour holter that was placed at [facility] ( p:[phone]). Patient called them yesterday and was told that it was mailed to our clinic on 10/29 and to him as well. I told him that I appreciated his follow up on this. He returns 11/7 to see Dr. Borrero.Chad

## 2018-11-01 ENCOUNTER — DOCUMENTATION ONLY (OUTPATIENT)
Dept: CARDIOLOGY | Facility: CLINIC | Age: 48
End: 2018-11-01

## 2018-11-02 ENCOUNTER — TELEPHONE (OUTPATIENT)
Dept: CARDIOLOGY | Facility: CLINIC | Age: 48
End: 2018-11-02

## 2018-11-02 NOTE — TELEPHONE ENCOUNTER
Call back to pt and made aware that Holter has not arrived as of yet, but the mail may be taking longer. Will check on Monday and if not her call to have faxed to clinic. Pt stated understanding. [place]

## 2018-11-07 ENCOUNTER — OFFICE VISIT (OUTPATIENT)
Dept: CARDIOLOGY | Facility: CLINIC | Age: 48
End: 2018-11-07

## 2018-11-07 VITALS
BODY MASS INDEX: 23.43 KG/M2 | DIASTOLIC BLOOD PRESSURE: 78 MMHG | SYSTOLIC BLOOD PRESSURE: 124 MMHG | HEIGHT: 72 IN | HEART RATE: 84 BPM | WEIGHT: 173 LBS

## 2018-11-07 DIAGNOSIS — I49.3 PVC'S (PREMATURE VENTRICULAR CONTRACTIONS): Primary | ICD-10-CM

## 2018-11-07 PROCEDURE — 99212 OFFICE O/P EST SF 10 MIN: CPT | Performed by: INTERNAL MEDICINE

## 2018-11-07 NOTE — PROGRESS NOTES
"Service Date: 11/07/2018      HISTORY OF PRESENT ILLNESS:    I had the pleasure of seeing Mr. Wilfrid Hernandez, a delightful 48-year-old  with the following medical issues.   A.  Minimally symptomatic benign PVCs.    B.  Structurally normal heart by echocardiography in 2017.   C.  Normal stress test in 2017.      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  PVC burden was 7% in 2017.  He recently completed a 24-hour Holter monitor showing a 4% PVC burden (4,050 PVCs in 24 hours).  There were no runs of ventricular tachycardia.      Wilfrid told me that his PVCs were extremely quiet until early fall and since then he has experienced an occasional \"blips.\"  He has been feeling very well otherwise.  He continues to exercise regularly, walking uphill on his treadmill for 30 minutes (5%-6% incline at a speed of 3.5-4 miles per hour).      He has not had syncope, near syncope, chest pain or other concerning cardiac symptoms.  He does not have family history of early onset cardiac disease or sudden death.      PHYSICAL EXAMINATION:   VITAL SIGNS:  Blood pressure 124/78, pulse 84 and regular, weight is 78 kg;  height 183 cm.   GENERAL:  He is a pleasant, healthy-appearing male in no distress.   HEENT:  Head normocephalic.   NECK:  Supple without carotid bruits.  Normal jugular venous pressure.   LUNGS:  Completely clear.   CARDIOVASCULAR:  Regular rhythm without ectopic beats.  No gallop, murmur or rub.   ABDOMEN:  Soft, nontender.   EXTREMITIES:  No edema.      DIAGNOSTIC STUDIES:  Holter results as outlined in the HPI.      IMPRESSION & PLAN:   1.  Idiopathic PVCs.  This is an innocent arrhythmia with excellent prognosis.  Wilfrid has no evidence of structural heart disease by history, physical examination, echocardiography and stress testing.  Symptoms related to the PVCs are minimal.  No treatment is warranted.  I reassured him.      No further " follow-up with Cardiology is necessary, unless symptoms increase in the future.      It was my pleasure being involved in this delightful gentleman's care.  Please feel free to contact me with any questions.        CARLOTTA WATERS MD, FACC           cc:   Maverick Penn MD   [address]  [address]            D: 2018   T: 2018   MT: RICHARDSON      Name:     TARA HUNTER   MRN:      [id number]        Account:      [id number]   :      1970           Service Date: 2018      Document: [id number]

## 2018-11-07 NOTE — MR AVS SNAPSHOT
After Visit Summary   11/7/2018    Wilfrid Hernandez    MRN: [id number]           Patient Information     Date Of Birth          1970        Visit Information        Provider Department      11/7/2018 8:15 AM Stone Borrero MD [facility]   [place]        Today's Diagnoses     PVC's (premature ventricular contractions)    -  1       Follow-ups after your visit        Who to contact     If you have questions or need follow up information about today's clinic visit or your schedule please contact [facility]   [place] directly at [phone].  Normal or non-critical lab and imaging results will be communicated to you by MyChart, letter or phone within 4 business days after the clinic has received the results. If you do not hear from us within 7 days, please contact the clinic through MyChart or phone. If you have a critical or abnormal lab result, we will notify you by phone as soon as possible.  Submit refill requests through [employer] or call your pharmacy and they will forward the refill request to us. Please allow 3 business days for your refill to be completed.          Additional Information About Your Visit        Care EveryWhere ID     This is your Care EveryWhere ID. This could be used by other organizations to access your [place] medical records  [id number]        Your Vitals Were     Pulse Height BMI (Body Mass Index)             84 1.829 m (6') 23.46 kg/m2          Blood Pressure from Last 3 Encounters:   11/07/18 124/78   12/19/17 148/56   11/28/17 125/85    Weight from Last 3 Encounters:   11/07/18 78.5 kg (173 lb)   12/19/17 76.2 kg (168 lb)   11/28/17 76.2 kg (168 lb)              Today, you had the following     No orders found for display       Primary Care Provider Office Phone # Fax #    Maverick Penn [phone] [address] [phone]        Equal Access to  Services     [facility] : Hadii gustavo Jensen, washyamda marioadaha, qawade capellan, bird oneal. So [facility] [phone].    ATENCIÓN: Si habla español, tiene a damon disposición servicios gratuitos de asistencia lingüística. Llame al [phone].    We comply with applicable federal civil rights laws and [place] laws. We do not discriminate on the basis of race, color, national origin, age, disability, sex, sexual orientation, or gender identity.            Thank you!     Thank you for choosing [facility]ART [facility]  for your care. Our goal is always to provide you with excellent care. Hearing back from our patients is one way we can continue to improve our services. Please take a few minutes to complete the written survey that you may receive in the mail after your visit with us. Thank you!             Your Updated Medication List - Protect others around you: Learn how to safely use, store and throw away your medicines at www.disposemymeds.org.      Notice  As of 11/7/2018  8:40 AM    You have not been prescribed any medications.

## 2018-11-07 NOTE — PROGRESS NOTES
HPI and Plan:   See dictation    No orders of the defined types were placed in this encounter.      No orders of the defined types were placed in this encounter.      There are no discontinued medications.      Encounter Diagnosis   Name Primary?     CARDIOVASCULAR SCREENING; LDL GOAL LESS THAN 160 Yes       CURRENT MEDICATIONS:  No current outpatient prescriptions on file.       ALLERGIES     Allergies   Allergen Reactions     No Known Allergies        PAST MEDICAL HISTORY:  Past Medical History:   Diagnosis Date     Palpitations      PVC (premature ventricular contraction)        PAST SURGICAL HISTORY:  History reviewed. No pertinent surgical history.    FAMILY HISTORY:  Family History   Problem Relation Age of Onset     HEART DISEASE No family hx of      Diabetes No family hx of        SOCIAL HISTORY:  Social History     Social History     Marital status:      Spouse name: Janice     Number of children: 0     Years of education: N/A     Occupational History      [employer]     Social History Main Topics     Smoking status: Former Smoker     Packs/day: 0.50     Years: 5.00     Types: Cigarettes     Start date: 1987     Quit date: 1/1/1997     Smokeless tobacco: Never Used     Alcohol use 0.0 oz/week     0 Standard drinks or equivalent per week      Comment: 3 glasses of wine per week     Drug use: No     Sexual activity: Yes     Partners: Female     Other Topics Concern     None     Social History Narrative       Review of Systems:  Skin:  Negative       Eyes:  Negative      ENT:  Negative      Respiratory:  Negative       Cardiovascular:    palpitations;Positive for (Comes and goes, none currently)    Gastroenterology: Negative      Genitourinary:  Negative      Musculoskeletal:  Negative      Neurologic:  Negative      Psychiatric:  Negative      Heme/Lymph/Imm:  Negative      Endocrine:  Negative        [id number]

## 2018-11-07 NOTE — LETTER
11/7/2018    Maverick Penn MD  [address]  [place] [address]    RE: Wilfrid Hernandez       Dear Colleague,    I had the pleasure of seeing Wilfrid Hernandez in the [facility].    HPI and Plan:   See dictation    No orders of the defined types were placed in this encounter.      No orders of the defined types were placed in this encounter.      There are no discontinued medications.      Encounter Diagnosis   Name Primary?     CARDIOVASCULAR SCREENING; LDL GOAL LESS THAN 160 Yes       CURRENT MEDICATIONS:  No current outpatient prescriptions on file.       ALLERGIES     Allergies   Allergen Reactions     No Known Allergies        PAST MEDICAL HISTORY:  Past Medical History:   Diagnosis Date     Palpitations      PVC (premature ventricular contraction)        PAST SURGICAL HISTORY:  History reviewed. No pertinent surgical history.    FAMILY HISTORY:  Family History   Problem Relation Age of Onset     HEART DISEASE No family hx of      Diabetes No family hx of        SOCIAL HISTORY:  Social History     Social History     Marital status:      Spouse name: Janice     Number of children: 0     Years of education: N/A     Occupational History      [employer]     Social History Main Topics     Smoking status: Former Smoker     Packs/day: 0.50     Years: 5.00     Types: Cigarettes     Start date: 1987     Quit date: 1/1/1997     Smokeless tobacco: Never Used     Alcohol use 0.0 oz/week     0 Standard drinks or equivalent per week      Comment: 3 glasses of wine per week     Drug use: No     Sexual activity: Yes     Partners: Female     Other Topics Concern     None     Social History Narrative       Review of Systems:  Skin:  Negative       Eyes:  Negative      ENT:  Negative      Respiratory:  Negative       Cardiovascular:    palpitations;Positive for (Comes and goes, none currently)    Gastroenterology: Negative      Genitourinary:  Negative       Musculoskeletal:  Negative      Neurologic:  Negative      Psychiatric:  Negative      Heme/Lymph/Imm:  Negative      Endocrine:  Negative        [id number]          Thank you for allowing me to participate in the care of your patient.      Sincerely,     Stone Borrero MD     [facility] [place]    cc:   No referring provider defined for this encounter.

## 2018-11-07 NOTE — LETTER
"11/7/2018      Maverick Penn MD  [address]  [facility] [address]      RE: Wilfrid Hernandez       Dear Colleague,    I had the pleasure of seeing Wilfrid Hernandez in the [facility].    Service Date: 11/07/2018      HISTORY OF PRESENT ILLNESS:    I had the pleasure of seeing Mr. Wilfrid Hernandez, a delightful 48-year-old  with the following medical issues.   A.  Minimally symptomatic benign PVCs.    B.  Structurally normal heart by echocardiography in 2017.   C.  Normal stress test in 2017.      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  PVC burden was 7% in 2017.  He recently completed a 24-hour Holter monitor showing a 4% PVC burden (4,050 PVCs in 24 hours).  There were no runs of ventricular tachycardia.      Wilfrid told me that his PVCs were extremely quiet until early fall and since then he has experienced an occasional \"blips.\"  He has been feeling very well otherwise.  He continues to exercise regularly, walking uphill on his treadmill for 30 minutes (5%-6% incline at a speed of 3.5-4 miles per hour).      He has not had syncope, near syncope, chest pain or other concerning cardiac symptoms.  He does not have family history of early onset cardiac disease or sudden death.      PHYSICAL EXAMINATION:   VITAL SIGNS:  Blood pressure 124/78, pulse 84 and regular, weight is 78 kg;  height 183 cm.   GENERAL:  He is a pleasant, healthy-appearing male in no distress.   HEENT:  Head normocephalic.   NECK:  Supple without carotid bruits.  Normal jugular venous pressure.   LUNGS:  Completely clear.   CARDIOVASCULAR:  Regular rhythm without ectopic beats.  No gallop, murmur or rub.   ABDOMEN:  Soft, nontender.   EXTREMITIES:  No edema.      DIAGNOSTIC STUDIES:  Holter results as outlined in the HPI.      IMPRESSION & PLAN:   1.  Idiopathic PVCs.  This is an innocent arrhythmia with excellent prognosis.  Wilfrid" has no evidence of structural heart disease by history, physical examination, echocardiography and stress testing.  Symptoms related to the PVCs are minimal.  No treatment is warranted.  I reassured him.      No further follow-up with Cardiology is necessary, unless symptoms increase in the future.      It was my pleasure being involved in this delightful gentleman's care.  Please feel free to contact me with any questions.        CARLOTTA WATERS MD, FACC           cc:   [place] [address]        D: 2018   T: 2018   MT: RICHARDSON      Name:     TARA HUNTER   MRN:      [id number]        Account:      [id number]   :      1970           Service Date: 2018      Document: [id number]        No outpatient encounter prescriptions on file as of 2018.     No facility-administered encounter medications on file as of 2018.      Again, thank you for allowing me to participate in the care of your patient.      Sincerely,    Carlotta Waters MD     [facility]

## 2018-11-12 ENCOUNTER — TELEPHONE (OUTPATIENT)
Dept: CARDIOLOGY | Facility: CLINIC | Age: 48
End: 2018-11-12

## 2018-11-12 NOTE — TELEPHONE ENCOUNTER
Pt called and LM wondering about a letter to be signed on Dr Borrero desk and copies to be mailed to his house.  Also mention something about a packet/Holter.  LM for pt to call back, as there is not letter that this writer can see. MilenaRN

## 2018-11-12 NOTE — TELEPHONE ENCOUNTER
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  I told him the note would be ready by today and it was actually signed last Wednesday.    Thx, D

## 2018-11-12 NOTE — TELEPHONE ENCOUNTER
Pt stated that Dr Borrero was going to write a letter to be sent to [employer] and that pt was hoping it would signed today and could be mailed to him with the Holter results.  Explained that will remind Dr Borrero of the letter, but also reminded pt that Dr Borrero was out of the office on Thursday and Friday last week and has been in the lab all day today, so can not know as exact time letter will be done.  Will also need to find the Holter.  Pt states understanding and stated that he was out of town this week also. Piter

## 2018-11-13 NOTE — TELEPHONE ENCOUNTER
Pt called back and LM stating that he would obtain another Holter from [facility] [place] and the office visit is fine to be mailed out.  Letter has been mailed. [place]

## 2018-11-13 NOTE — TELEPHONE ENCOUNTER
LM for pt to call back.  Will mail out copy of last OV night that has been signed by Dr Borrero.  Asked that pt obtain another copy of his Holter, as this writer can not find copy. It also would be nice to get another copy mailed to clinic to have scanned into [facility]. [place]